# Patient Record
Sex: MALE | Race: WHITE | NOT HISPANIC OR LATINO | URBAN - METROPOLITAN AREA
[De-identification: names, ages, dates, MRNs, and addresses within clinical notes are randomized per-mention and may not be internally consistent; named-entity substitution may affect disease eponyms.]

---

## 2017-07-22 ENCOUNTER — EMERGENCY (EMERGENCY)
Facility: HOSPITAL | Age: 22
LOS: 1 days | Discharge: HOME | End: 2017-07-22
Attending: EMERGENCY MEDICINE | Admitting: EMERGENCY MEDICINE
Payer: MEDICAID

## 2017-07-22 VITALS
DIASTOLIC BLOOD PRESSURE: 83 MMHG | HEART RATE: 126 BPM | TEMPERATURE: 98 F | RESPIRATION RATE: 18 BRPM | WEIGHT: 279.99 LBS | SYSTOLIC BLOOD PRESSURE: 136 MMHG | OXYGEN SATURATION: 97 %

## 2017-07-22 VITALS
OXYGEN SATURATION: 97 % | DIASTOLIC BLOOD PRESSURE: 85 MMHG | SYSTOLIC BLOOD PRESSURE: 133 MMHG | TEMPERATURE: 100 F | RESPIRATION RATE: 18 BRPM | HEART RATE: 115 BPM

## 2017-07-22 DIAGNOSIS — R00.2 PALPITATIONS: ICD-10-CM

## 2017-07-22 DIAGNOSIS — R00.0 TACHYCARDIA, UNSPECIFIED: ICD-10-CM

## 2017-07-22 DIAGNOSIS — R07.9 CHEST PAIN, UNSPECIFIED: ICD-10-CM

## 2017-07-22 LAB
ANION GAP SERPL CALC-SCNC: 3 MMOL/L — LOW (ref 5–17)
BUN SERPL-MCNC: 15 MG/DL — SIGNIFICANT CHANGE UP (ref 7–23)
CALCIUM SERPL-MCNC: 9.1 MG/DL — SIGNIFICANT CHANGE UP (ref 8.5–10.1)
CHLORIDE SERPL-SCNC: 104 MMOL/L — SIGNIFICANT CHANGE UP (ref 96–108)
CO2 SERPL-SCNC: 31 MMOL/L — SIGNIFICANT CHANGE UP (ref 22–31)
CREAT SERPL-MCNC: 1 MG/DL — SIGNIFICANT CHANGE UP (ref 0.5–1.3)
D DIMER BLD IA.RAPID-MCNC: <150 NG/ML DDU — SIGNIFICANT CHANGE UP
GLUCOSE SERPL-MCNC: 112 MG/DL — HIGH (ref 70–99)
HCT VFR BLD CALC: 48.2 % — SIGNIFICANT CHANGE UP (ref 39–50)
HGB BLD-MCNC: 16.2 G/DL — SIGNIFICANT CHANGE UP (ref 13–17)
MCHC RBC-ENTMCNC: 29.2 PG — SIGNIFICANT CHANGE UP (ref 27–34)
MCHC RBC-ENTMCNC: 33.8 GM/DL — SIGNIFICANT CHANGE UP (ref 32–36)
MCV RBC AUTO: 86.6 FL — SIGNIFICANT CHANGE UP (ref 80–100)
PLATELET # BLD AUTO: 213 K/UL — SIGNIFICANT CHANGE UP (ref 150–400)
POTASSIUM SERPL-MCNC: 4.1 MMOL/L — SIGNIFICANT CHANGE UP (ref 3.5–5.3)
POTASSIUM SERPL-SCNC: 4.1 MMOL/L — SIGNIFICANT CHANGE UP (ref 3.5–5.3)
RBC # BLD: 5.56 M/UL — SIGNIFICANT CHANGE UP (ref 4.2–5.8)
RBC # FLD: 11.4 % — SIGNIFICANT CHANGE UP (ref 10.3–14.5)
SODIUM SERPL-SCNC: 138 MMOL/L — SIGNIFICANT CHANGE UP (ref 135–145)
TROPONIN I SERPL-MCNC: <.015 NG/ML — SIGNIFICANT CHANGE UP (ref 0.01–0.04)
WBC # BLD: 6.6 K/UL — SIGNIFICANT CHANGE UP (ref 3.8–10.5)
WBC # FLD AUTO: 6.6 K/UL — SIGNIFICANT CHANGE UP (ref 3.8–10.5)

## 2017-07-22 PROCEDURE — 99284 EMERGENCY DEPT VISIT MOD MDM: CPT

## 2017-07-22 PROCEDURE — 80048 BASIC METABOLIC PNL TOTAL CA: CPT

## 2017-07-22 PROCEDURE — 84484 ASSAY OF TROPONIN QUANT: CPT

## 2017-07-22 PROCEDURE — 36415 COLL VENOUS BLD VENIPUNCTURE: CPT

## 2017-07-22 PROCEDURE — 99285 EMERGENCY DEPT VISIT HI MDM: CPT

## 2017-07-22 PROCEDURE — 85027 COMPLETE CBC AUTOMATED: CPT

## 2017-07-22 PROCEDURE — 84443 ASSAY THYROID STIM HORMONE: CPT

## 2017-07-22 PROCEDURE — 85379 FIBRIN DEGRADATION QUANT: CPT

## 2017-07-22 PROCEDURE — 71046 X-RAY EXAM CHEST 2 VIEWS: CPT

## 2017-07-22 PROCEDURE — 93005 ELECTROCARDIOGRAM TRACING: CPT

## 2017-07-22 PROCEDURE — 71020: CPT | Mod: 26

## 2017-07-22 NOTE — ED ADULT NURSE NOTE - OBJECTIVE STATEMENT
pt to ed c/o palpitations, pt states he feels his heart racing, pt does seem anxious, no daily meds, afebrile, no SOB or diaphoresis, will continue to monitor

## 2017-07-22 NOTE — ED PROVIDER NOTE - CONSTITUTIONAL, MLM
Well appearing, obese male,  well nourished, awake, alert, oriented to person, place, time/situation and in no apparent distress. normal...

## 2017-07-22 NOTE — ED PROVIDER NOTE - OBJECTIVE STATEMENT
22 yo male with intermittent self limited episodes of left sided chest pains, ill defined with palpitations x few days here for evaluation. No cough or SOB. No DVT/PE/CAD risk factors.

## 2017-07-22 NOTE — ED PROVIDER NOTE - PROGRESS NOTE DETAILS
Feel well at this time. HR ranges from 110-130 w/o symptoms. Patient with his sister wants to go home to be followed up as outpatient.

## 2018-05-13 ENCOUNTER — TRANSCRIPTION ENCOUNTER (OUTPATIENT)
Age: 23
End: 2018-05-13

## 2018-06-28 ENCOUNTER — TRANSCRIPTION ENCOUNTER (OUTPATIENT)
Age: 23
End: 2018-06-28

## 2018-11-26 ENCOUNTER — EMERGENCY (EMERGENCY)
Facility: HOSPITAL | Age: 23
LOS: 1 days | Discharge: HOME | End: 2018-11-26
Attending: EMERGENCY MEDICINE
Payer: MEDICAID

## 2018-11-26 VITALS — WEIGHT: 248.9 LBS | HEIGHT: 71 IN

## 2018-11-26 LAB
ALBUMIN SERPL ELPH-MCNC: 4 G/DL — SIGNIFICANT CHANGE UP (ref 3.3–5)
ALP SERPL-CCNC: 568 U/L — HIGH (ref 40–120)
ALT FLD-CCNC: 25 U/L — SIGNIFICANT CHANGE UP (ref 12–78)
ANION GAP SERPL CALC-SCNC: 7 MMOL/L — SIGNIFICANT CHANGE UP (ref 5–17)
APPEARANCE UR: CLEAR — SIGNIFICANT CHANGE UP
AST SERPL-CCNC: 11 U/L — LOW (ref 15–37)
BASOPHILS # BLD AUTO: 0.01 K/UL — SIGNIFICANT CHANGE UP (ref 0–0.2)
BASOPHILS NFR BLD AUTO: 0.2 % — SIGNIFICANT CHANGE UP (ref 0–2)
BILIRUB SERPL-MCNC: 1.1 MG/DL — SIGNIFICANT CHANGE UP (ref 0.2–1.2)
BILIRUB UR-MCNC: NEGATIVE — SIGNIFICANT CHANGE UP
BUN SERPL-MCNC: 17 MG/DL — SIGNIFICANT CHANGE UP (ref 7–23)
CALCIUM SERPL-MCNC: 8.4 MG/DL — LOW (ref 8.5–10.1)
CHLORIDE SERPL-SCNC: 108 MMOL/L — SIGNIFICANT CHANGE UP (ref 96–108)
CO2 SERPL-SCNC: 27 MMOL/L — SIGNIFICANT CHANGE UP (ref 22–31)
COLOR SPEC: YELLOW — SIGNIFICANT CHANGE UP
CREAT SERPL-MCNC: 0.91 MG/DL — SIGNIFICANT CHANGE UP (ref 0.5–1.3)
DIFF PNL FLD: NEGATIVE — SIGNIFICANT CHANGE UP
EOSINOPHIL # BLD AUTO: 0.05 K/UL — SIGNIFICANT CHANGE UP (ref 0–0.5)
EOSINOPHIL NFR BLD AUTO: 0.8 % — SIGNIFICANT CHANGE UP (ref 0–6)
GLUCOSE SERPL-MCNC: 91 MG/DL — SIGNIFICANT CHANGE UP (ref 70–99)
GLUCOSE UR QL: NEGATIVE — SIGNIFICANT CHANGE UP
HCT VFR BLD CALC: 31.1 % — LOW (ref 39–50)
HGB BLD-MCNC: 10.4 G/DL — LOW (ref 13–17)
IMM GRANULOCYTES NFR BLD AUTO: 0.3 % — SIGNIFICANT CHANGE UP (ref 0–1.5)
KETONES UR-MCNC: NEGATIVE — SIGNIFICANT CHANGE UP
LEUKOCYTE ESTERASE UR-ACNC: NEGATIVE — SIGNIFICANT CHANGE UP
LIDOCAIN IGE QN: 154 U/L — SIGNIFICANT CHANGE UP (ref 73–393)
LYMPHOCYTES # BLD AUTO: 1.64 K/UL — SIGNIFICANT CHANGE UP (ref 1–3.3)
LYMPHOCYTES # BLD AUTO: 25.8 % — SIGNIFICANT CHANGE UP (ref 13–44)
MCHC RBC-ENTMCNC: 28.7 PG — SIGNIFICANT CHANGE UP (ref 27–34)
MCHC RBC-ENTMCNC: 33.4 GM/DL — SIGNIFICANT CHANGE UP (ref 32–36)
MCV RBC AUTO: 85.9 FL — SIGNIFICANT CHANGE UP (ref 80–100)
MONOCYTES # BLD AUTO: 0.47 K/UL — SIGNIFICANT CHANGE UP (ref 0–0.9)
MONOCYTES NFR BLD AUTO: 7.4 % — SIGNIFICANT CHANGE UP (ref 2–14)
NEUTROPHILS # BLD AUTO: 4.17 K/UL — SIGNIFICANT CHANGE UP (ref 1.8–7.4)
NEUTROPHILS NFR BLD AUTO: 65.5 % — SIGNIFICANT CHANGE UP (ref 43–77)
NITRITE UR-MCNC: NEGATIVE — SIGNIFICANT CHANGE UP
PH UR: 6.5 — SIGNIFICANT CHANGE UP (ref 5–8)
PLATELET # BLD AUTO: 140 K/UL — LOW (ref 150–400)
POTASSIUM SERPL-MCNC: 3.9 MMOL/L — SIGNIFICANT CHANGE UP (ref 3.5–5.3)
POTASSIUM SERPL-SCNC: 3.9 MMOL/L — SIGNIFICANT CHANGE UP (ref 3.5–5.3)
PROT SERPL-MCNC: 7.5 G/DL — SIGNIFICANT CHANGE UP (ref 6–8.3)
PROT UR-MCNC: NEGATIVE — SIGNIFICANT CHANGE UP
RBC # BLD: 3.62 M/UL — LOW (ref 4.2–5.8)
RBC # FLD: 12.2 % — SIGNIFICANT CHANGE UP (ref 10.3–14.5)
SODIUM SERPL-SCNC: 142 MMOL/L — SIGNIFICANT CHANGE UP (ref 135–145)
SP GR SPEC: 1.01 — SIGNIFICANT CHANGE UP (ref 1.01–1.02)
UROBILINOGEN FLD QL: NEGATIVE — SIGNIFICANT CHANGE UP
WBC # BLD: 6.36 K/UL — SIGNIFICANT CHANGE UP (ref 3.8–10.5)
WBC # FLD AUTO: 6.36 K/UL — SIGNIFICANT CHANGE UP (ref 3.8–10.5)

## 2018-11-26 PROCEDURE — 76705 ECHO EXAM OF ABDOMEN: CPT | Mod: 26

## 2018-11-26 PROCEDURE — 74177 CT ABD & PELVIS W/CONTRAST: CPT | Mod: 26

## 2018-11-26 PROCEDURE — 99285 EMERGENCY DEPT VISIT HI MDM: CPT

## 2018-11-26 RX ORDER — FAMOTIDINE 10 MG/ML
1 INJECTION INTRAVENOUS
Qty: 0 | Refills: 0 | COMMUNITY

## 2018-11-26 RX ORDER — SODIUM CHLORIDE 9 MG/ML
1000 INJECTION INTRAMUSCULAR; INTRAVENOUS; SUBCUTANEOUS ONCE
Qty: 0 | Refills: 0 | Status: COMPLETED | OUTPATIENT
Start: 2018-11-26 | End: 2018-11-26

## 2018-11-26 RX ORDER — SODIUM CHLORIDE 9 MG/ML
3 INJECTION INTRAMUSCULAR; INTRAVENOUS; SUBCUTANEOUS ONCE
Qty: 0 | Refills: 0 | Status: COMPLETED | OUTPATIENT
Start: 2018-11-26 | End: 2018-11-26

## 2018-11-26 RX ORDER — PANTOPRAZOLE SODIUM 20 MG/1
1 TABLET, DELAYED RELEASE ORAL
Qty: 0 | Refills: 0 | COMMUNITY

## 2018-11-26 RX ADMIN — SODIUM CHLORIDE 3 MILLILITER(S): 9 INJECTION INTRAMUSCULAR; INTRAVENOUS; SUBCUTANEOUS at 22:16

## 2018-11-26 RX ADMIN — SODIUM CHLORIDE 1000 MILLILITER(S): 9 INJECTION INTRAMUSCULAR; INTRAVENOUS; SUBCUTANEOUS at 23:07

## 2018-11-26 RX ADMIN — SODIUM CHLORIDE 1000 MILLILITER(S): 9 INJECTION INTRAMUSCULAR; INTRAVENOUS; SUBCUTANEOUS at 22:15

## 2018-11-26 NOTE — ED PROVIDER NOTE - OBJECTIVE STATEMENT
23 year old male with history of GERD presents with abdominal pain since yesterday. started last night around 8:00 pm after playing soccer. denies any injury or trauma. pain 4/10. worse with movement. has not taken anything for pain or symptoms. no n/v/d. no fever or loss or appetite  PCP Rogelio Chaudhry

## 2018-11-26 NOTE — ED ADULT NURSE NOTE - NSIMPLEMENTINTERV_GEN_ALL_ED
Implemented All Universal Safety Interventions:  [place] to call system. Call bell, personal items and telephone within reach. Instruct patient to call for assistance. Room bathroom lighting operational. Non-slip footwear when patient is off stretcher. Physically safe environment: no spills, clutter or unnecessary equipment. Stretcher in lowest position, wheels locked, appropriate side rails in place.

## 2018-11-26 NOTE — ED PROVIDER NOTE - PROGRESS NOTE DETAILS
resting comfortably. waiting for CT and US results pt and family aware of the us and labs await ct still has rlq discomfort

## 2018-11-26 NOTE — ED PROVIDER NOTE - ATTENDING CONTRIBUTION TO CARE
Pt is a 22 yo male who has hx of gerd and formerly obese was on bp meds not any longer  presents with 24 hours of r sided abd pain no fever n chills no n v d   no cp no sob no urine sx no back pain  on eval:  obese heent cor lungs chest normal  pt has tender to r paraumbilical area no mejia no cvat  ext neg  neuro normal  skin normal  ro appy ro gb labs

## 2018-11-26 NOTE — ED ADULT NURSE NOTE - OBJECTIVE STATEMENT
Received pt awake alert and oriented x 3, CHICAS. Pt present to the ED CO abdominal pain started yesterday, denies nausea a, vomiting or diarrhea. Vss, afebrile. Iv placed, lab sent. Fluid started as ordered by MD. will continue to monitor.

## 2018-11-26 NOTE — ED PROVIDER NOTE - MEDICAL DECISION MAKING DETAILS
right sided abdominal pain since yesterday. has mild pain to both upper and lower quadrants. will US to eval for gallbladder and CT to eval for appendicitis. will also order CBC, CMP, lipase, and UA. will give IV fluids. declines pain or nausea meds

## 2018-11-26 NOTE — ED PROVIDER NOTE - CHPI ED SYMPTOMS NEG
no diarrhea/no chills/no dysuria/no hematuria/no fever/no burning urination/no vomiting/no abdominal distension/no blood in stool/no nausea

## 2018-11-27 ENCOUNTER — TRANSCRIPTION ENCOUNTER (OUTPATIENT)
Age: 23
End: 2018-11-27

## 2018-11-27 VITALS
TEMPERATURE: 98 F | DIASTOLIC BLOOD PRESSURE: 76 MMHG | RESPIRATION RATE: 18 BRPM | OXYGEN SATURATION: 97 % | SYSTOLIC BLOOD PRESSURE: 126 MMHG | HEART RATE: 89 BPM

## 2018-11-27 PROCEDURE — 99284 EMERGENCY DEPT VISIT MOD MDM: CPT | Mod: 25

## 2018-11-27 PROCEDURE — 85027 COMPLETE CBC AUTOMATED: CPT

## 2018-11-27 PROCEDURE — 83690 ASSAY OF LIPASE: CPT

## 2018-11-27 PROCEDURE — 80053 COMPREHEN METABOLIC PANEL: CPT

## 2018-11-27 PROCEDURE — 76705 ECHO EXAM OF ABDOMEN: CPT

## 2018-11-27 PROCEDURE — 36415 COLL VENOUS BLD VENIPUNCTURE: CPT

## 2018-11-27 PROCEDURE — 74177 CT ABD & PELVIS W/CONTRAST: CPT

## 2018-11-27 PROCEDURE — 81003 URINALYSIS AUTO W/O SCOPE: CPT

## 2018-11-27 PROCEDURE — 96374 THER/PROPH/DIAG INJ IV PUSH: CPT

## 2018-11-27 PROCEDURE — 96360 HYDRATION IV INFUSION INIT: CPT

## 2018-11-27 RX ORDER — KETOROLAC TROMETHAMINE 30 MG/ML
30 SYRINGE (ML) INJECTION ONCE
Qty: 0 | Refills: 0 | Status: DISCONTINUED | OUTPATIENT
Start: 2018-11-27 | End: 2018-11-27

## 2018-11-27 RX ADMIN — Medication 30 MILLIGRAM(S): at 01:14

## 2018-12-27 ENCOUNTER — TRANSCRIPTION ENCOUNTER (OUTPATIENT)
Age: 23
End: 2018-12-27

## 2019-02-17 ENCOUNTER — TRANSCRIPTION ENCOUNTER (OUTPATIENT)
Age: 24
End: 2019-02-17

## 2019-03-24 ENCOUNTER — TRANSCRIPTION ENCOUNTER (OUTPATIENT)
Age: 24
End: 2019-03-24

## 2019-03-29 ENCOUNTER — TRANSCRIPTION ENCOUNTER (OUTPATIENT)
Age: 24
End: 2019-03-29

## 2019-06-28 ENCOUNTER — TRANSCRIPTION ENCOUNTER (OUTPATIENT)
Age: 24
End: 2019-06-28

## 2020-03-23 NOTE — ED ADULT NURSE NOTE - CAS EDN DISCHARGE ASSESSMENT
interpretation of diagnostic studies/documentation/consultation with other physicians/conducted a detailed discussion of DNR status/additional history taking/direct patient care (not related to procedure)
Alert and oriented to person, place and time

## 2021-08-24 NOTE — ED ADULT NURSE NOTE - CAS DISCH CONDITION
Procedure Note:


ICD 10 Code:


ICD 10 Code:


M70.72





Procedure Note:


Patient was consented for left ischial tuberosity bursa injection with 

fluoroscopic guidance.  Risk were discussed including not limited to bleeding 

infection possibility of intravascular ejection sequelae spread local anesthetic

and numbness side effects steroid medications post arthroscopy and portals 

regarding pain control.  Patient understands and wished proceed.


Patient in the right lateral decubitus position under sterile prep and drape 

using C-arm fluoroscopic guidance patient's left ischial tuberosity inferior 

aspect was identified and using local anesthetic area over the skin was 

anesthetized using a 25-gauge needle was entered under direct fluoroscopic 

visualization to contact to the inferior aspect of the left ischial tuberosity. 

This time negative aspiration was noted and 1 cc of contrast was injected with 

good spread at the level of the ischial tuberosity without washout or uptake.  

At this time solution containing 3 cc of 0.25% bupivacaine and 80 mg Depo-Medrol

was then injected.  Needle withdrawn and sterile bandage was applied.  Patient 

tolerated procedure well and had no complications.











JEN CASTRO MD               Aug 24, 2021 14:52 Stable

## 2022-08-08 PROBLEM — Z00.00 ENCOUNTER FOR PREVENTIVE HEALTH EXAMINATION: Status: ACTIVE | Noted: 2022-08-08

## 2022-09-07 ENCOUNTER — APPOINTMENT (OUTPATIENT)
Dept: NEUROLOGY | Facility: CLINIC | Age: 27
End: 2022-09-07

## 2022-09-07 VITALS
TEMPERATURE: 98.4 F | BODY MASS INDEX: 35.7 KG/M2 | SYSTOLIC BLOOD PRESSURE: 149 MMHG | WEIGHT: 255 LBS | OXYGEN SATURATION: 97 % | HEART RATE: 97 BPM | HEIGHT: 71 IN | DIASTOLIC BLOOD PRESSURE: 96 MMHG | RESPIRATION RATE: 20 BRPM

## 2022-09-07 VITALS — SYSTOLIC BLOOD PRESSURE: 133 MMHG | DIASTOLIC BLOOD PRESSURE: 88 MMHG

## 2022-09-07 DIAGNOSIS — Z82.49 FAMILY HISTORY OF ISCHEMIC HEART DISEASE AND OTHER DISEASES OF THE CIRCULATORY SYSTEM: ICD-10-CM

## 2022-09-07 DIAGNOSIS — K21.9 GASTRO-ESOPHAGEAL REFLUX DISEASE W/OUT ESOPHAGITIS: ICD-10-CM

## 2022-09-07 PROCEDURE — 99205 OFFICE O/P NEW HI 60 MIN: CPT

## 2022-09-07 RX ORDER — PANTOPRAZOLE 40 MG/1
40 TABLET, DELAYED RELEASE ORAL
Refills: 0 | Status: ACTIVE | COMMUNITY

## 2022-09-07 RX ORDER — SUMATRIPTAN 50 MG/1
50 TABLET, FILM COATED ORAL
Qty: 15 | Refills: 3 | Status: ACTIVE | COMMUNITY
Start: 2022-09-07 | End: 1900-01-01

## 2022-09-07 NOTE — ASSESSMENT
[FreeTextEntry1] : Assessment/Plan:\par  # Intermittent left hemibody paresthesias- unclear etiology of symptoms. Initial presentation of tingling over hands could likely have been explained by a carpal tunnel +/- cubital tunnel syndrome. Given his age, need to rule out a CNS pathology- especially demyelinating disease. Other considerations could be a migraine aura/phenomena- though atypical given duration of symptoms, or a psychosomatic symptom in the setting of anxiety/depression.\par [] MRI brain and cervical spine w/w/o to rule out demyelinating disease\par [] Recommend speaking to PCP regarding mood disorder. Recommending seeing a behavioral health therapist\par \par \par # Migraine headaches- \par [] Start sumatriptan 50 mg tablet, take 1 tablet at the onset of the headache. Can repeat dose in 2 hours if needed. Limit dose to 2 tab/day and 5 tab/week. Discussed the side effects drowsiness and nausea/diarrhea.\par [] Over the counter preventative therapies discussed, which include Magnesium 400 mg QD (discussed potential side effect of diarrhea), riboflavin 400 mg QD and Coenzyme Q10 100 mg daily.\par Headache education provided:\par [] Stay well hydrated\par [] Limit excessive caffeine and alcohol intake\par [] Maintain good sleep hygiene. Follow a consistent sleep and wake schedule. \par [] Practice good eating habits. Avoid skipping meals. \par [] Try to avoid any known triggers\par [] Avoid excessive use of over the counter pain medications, as they can cause medication overuse headaches \par [] Keep a headache diary\par [] Relaxation techniques, biofeedback, massage therapy, acupunctures, and heating pads may be effective\par \par \par Return to clinic 6-8 weeks\par \par The above plan was discussed with LINDA MCLAUGHLIN in great detail.  LINDA MCLAUGHLIN verbalized understanding and agrees with plan as detailed above. Patient was provided education and counselling on current diagnosis/symptoms, diagnostic work up, treatment options and potential side effects of any prescribed therapy/therapies. He was advised to call our clinic at [phone] for any new or worsening symptoms, or with any questions or concerns. In case of acute onset of neurological symptoms or worsening presentation, patient was advised to present to nearest emergency room for further evaluation. LINDA MCLAUGHLIN expressed understanding and all his questions/concerns were addressed.\par \par Marcelle Ybarra M.D\par

## 2022-09-07 NOTE — PHYSICAL EXAM
[FreeTextEntry1] : PHYSICAL EXAM\par Constitutional: Alert, no acute distress \par Psychiatric: appropriate affect and mood\par Pulmonary: No respiratory distress, stable on room air\par \par NEUROLOGICAL EXAM\par Mental status: The patient is alert, attentive, and conversational memory intact\par Speech/language: Clear and fluent with intact comprehension\par Cranial nerves:\par CN II: Visual fields are full to confrontation. Pupil size equal and briskly reactive to light. \par CN III, IV, VI: EOMI, no nystagmus, no ptosis\par CN V: Facial sensation is intact to pinprick in all 3 divisions bilaterally.\par CN VII: Face is symmetric with normal eye closure and smile.\par CN VII: Hearing is normal to rubbing fingers\par CN IX, X: Palate elevates symmetrically. Phonation is normal.\par CN XI: Head turning and shoulder shrug are intact\par CN XII: Tongue is midline with normal movements and no atrophy.\par Motor: Strength is full bilaterally. 5/5 muscle power in bilateral UE and LE.\par Reflexes: Reflexes are 2+ and symmetric at the biceps, knees, and ankles. Plantar responses are flexor.\par Sensory: Intact sensations to light touch in upper and lower extremities. \par Coordination: Rapid alternating movements and fine finger movements are intact. There is no dysmetria on finger-to-nose. There are no abnormal or extraneous movements. \par Gait/Stance: Posture is normal. Gait is steady with normal steps, base, arm swing, and turning. Heel and toe walking are normal. Tandem gait is normal. Romberg is absent.\par \par \par \par \par

## 2022-09-07 NOTE — HISTORY OF PRESENT ILLNESS
[FreeTextEntry1] : HPI (initial visit Sep 07, 2022)-Cherelle is a 26 year old RH male w/ of GERD , here for evaluation of sensory disturbances over left hemibody.\par \par Around June 2021- started having tingling in left hand over pinky and hypothenar eminence. 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 Spoke to a family doctor and told he had pinched a nerve and recommended avoiding too much pressure on hand- this improved his symptoms. Experienced similar symptoms in right hand, though mild and not bothersome.  \par Around Dec 2021, started to get tingly over back of left leg- over calf, this would happen at rest and not necessarily with activity. It does not involve his foot. It is intermittent.\par  Since 8/4/2022 began to experience tingling over left half of face, this was constant, and now improved-"Feels like goose bumps and gets worse when excited". Also experiences some "goose bumps" over left shoulder and left thigh- heightens when watching a good movie or playing video games.  He again spoke to family doctor and suggested stopping famotidine and this seemed to help.\par \par He does experience headaches when working out, not eating or sleeping well. He describes unilateral headaches (mostly left sided). More frequently lately, more sporadic in the past. NEeds to take OTC medications (Tylenol) now. + Photophobia. No nausea or vomiting. No vision changes. Duration: a few hours. Sister and mother have migraines. Not sure if tingling occurs with headaches. \par \par "Sleep is really bad". Since COVID has been experiencing some depression. Father had a cardiac arrest and that "was the worst experience I ever had". This caused him anxiety. He changed his routines and staying up more at night and plays video games. He is now working on sleep schedule. He has lost his motivation drive. His school performance worsened around cOVID and after father had cardiac arrest (needed CPR).\par \par Labs from PCP reviewed 8/22/022- TSH, CMP, CMP, Lyme, TEO negative, HbA1c. \par No hx of COVID infection.\par \par \par \par

## 2022-09-27 ENCOUNTER — NON-APPOINTMENT (OUTPATIENT)
Age: 27
End: 2022-09-27

## 2022-10-12 ENCOUNTER — NON-APPOINTMENT (OUTPATIENT)
Age: 27
End: 2022-10-12

## 2022-10-13 ENCOUNTER — EMERGENCY (EMERGENCY)
Facility: HOSPITAL | Age: 27
LOS: 1 days | Discharge: HOME | End: 2022-10-13
Attending: EMERGENCY MEDICINE | Admitting: EMERGENCY MEDICINE
Payer: MEDICAID

## 2022-10-13 VITALS
RESPIRATION RATE: 22 BRPM | TEMPERATURE: 100 F | SYSTOLIC BLOOD PRESSURE: 129 MMHG | HEIGHT: 71 IN | WEIGHT: 250 LBS | OXYGEN SATURATION: 100 % | DIASTOLIC BLOOD PRESSURE: 87 MMHG | HEART RATE: 123 BPM

## 2022-10-13 VITALS
HEART RATE: 116 BPM | OXYGEN SATURATION: 97 % | RESPIRATION RATE: 17 BRPM | SYSTOLIC BLOOD PRESSURE: 117 MMHG | DIASTOLIC BLOOD PRESSURE: 72 MMHG | TEMPERATURE: 98 F

## 2022-10-13 PROCEDURE — 27840 TREAT ANKLE DISLOCATION: CPT | Mod: 54

## 2022-10-13 PROCEDURE — 96375 TX/PRO/DX INJ NEW DRUG ADDON: CPT

## 2022-10-13 PROCEDURE — 99284 EMERGENCY DEPT VISIT MOD MDM: CPT | Mod: 57

## 2022-10-13 PROCEDURE — 99284 EMERGENCY DEPT VISIT MOD MDM: CPT

## 2022-10-13 PROCEDURE — 73600 X-RAY EXAM OF ANKLE: CPT | Mod: 26,RT

## 2022-10-13 PROCEDURE — 27840 TREAT ANKLE DISLOCATION: CPT | Mod: RT

## 2022-10-13 PROCEDURE — 96374 THER/PROPH/DIAG INJ IV PUSH: CPT

## 2022-10-13 PROCEDURE — 73600 X-RAY EXAM OF ANKLE: CPT

## 2022-10-13 PROCEDURE — 73610 X-RAY EXAM OF ANKLE: CPT

## 2022-10-13 RX ORDER — FENTANYL CITRATE 50 UG/ML
100 INJECTION INTRAVENOUS ONCE
Refills: 0 | Status: DISCONTINUED | OUTPATIENT
Start: 2022-10-13 | End: 2022-10-13

## 2022-10-13 RX ORDER — MIDAZOLAM HYDROCHLORIDE 1 MG/ML
4 INJECTION, SOLUTION INTRAMUSCULAR; INTRAVENOUS ONCE
Refills: 0 | Status: DISCONTINUED | OUTPATIENT
Start: 2022-10-13 | End: 2022-10-13

## 2022-10-13 RX ORDER — HYDROMORPHONE HYDROCHLORIDE 2 MG/ML
1 INJECTION INTRAMUSCULAR; INTRAVENOUS; SUBCUTANEOUS ONCE
Refills: 0 | Status: DISCONTINUED | OUTPATIENT
Start: 2022-10-13 | End: 2022-10-13

## 2022-10-13 RX ADMIN — MIDAZOLAM HYDROCHLORIDE 4 MILLIGRAM(S): 1 INJECTION, SOLUTION INTRAMUSCULAR; INTRAVENOUS at 21:44

## 2022-10-13 RX ADMIN — HYDROMORPHONE HYDROCHLORIDE 1 MILLIGRAM(S): 2 INJECTION INTRAMUSCULAR; INTRAVENOUS; SUBCUTANEOUS at 21:53

## 2022-10-13 RX ADMIN — FENTANYL CITRATE 100 MICROGRAM(S): 50 INJECTION INTRAVENOUS at 21:44

## 2022-10-13 NOTE — ED ADULT NURSE NOTE - NS ED NURSE LEVEL OF CONSCIOUSNESS ORIENTATION
[FreeTextEntry1] : Started on Fluconazole\par To see again the urologist call if not well Oriented - self; Oriented - place; Oriented - time

## 2022-10-13 NOTE — ED PROVIDER NOTE - CARE PROVIDER_API CALL
Sammy Stein)  Orthopaedic Surgery; Sports Medicine  [address], [address] [place] [address]  Phone: [phone]  Fax: [phone]  Follow Up Time: 1-3 Days    Magdiel Ledbetter)  Orthopaedic Surgery  [address] [place] [address]  Phone: [phone]  Fax: [phone]  Follow Up Time:     Hardik Blair ()  Orthopaedic Surgery  [address] [place] [address]  Phone: [phone]  Fax: [phone]  Follow Up Time:

## 2022-10-13 NOTE — ED PROVIDER NOTE - PROVIDER TOKENS
PROVIDER:[TOKEN:[[id number]],FOLLOWUP:[1-3 Days]],PROVIDER:[TOKEN:[[id number]]],PROVIDER:[TOKEN:[[id number]]]

## 2022-10-13 NOTE — ED PROVIDER NOTE - PATIENT PORTAL LINK FT
You can access the FollowMyHealth Patient Portal offered by [facility] by registering at the following website: [email or web address]. By joining [employer]’s FollowMyHealth portal, you will also be able to view your health information using other applications (apps) compatible with our system.

## 2022-10-13 NOTE — ED PROVIDER NOTE - CARE PLAN
1 Principal Discharge DX:	Fracture dislocation of ankle   Principal Discharge DX:	Dislocation of ankle

## 2022-10-13 NOTE — ED PROVIDER NOTE - NSFOLLOWUPINSTRUCTIONS_ED_ALL_ED_FT
Ankle Dislocation    Bones of the lower leg and foot showing an ankle dislocation.   Ankle dislocation is an injury in which the bones that form the ankle are moved out of position. The bones that form your ankle are the two bones in your lower leg and the bone on top of your foot.      What are the causes?    Common causes of this injury include:  •Tripping.      •Falling.      •A car accident.      This injury happens when a lot of force is applied to the ankle, such as when it is twisted or rotated.      What increases the risk?    This injury is more likely to develop in people who:  •Are born with looseness (elasticity) in their ligaments.      •Have weak lower leg muscles.      •Sprain their ankle often.        What are the signs or symptoms?  Comparison of a normal ankle and an ankle that is swollen and bruised.   Symptoms of this injury include:  •Severe pain.      •A misshapen or deformed ankle.      •Swelling.      •Bruising.      •Not being able to move the foot.      •Not being able to use the foot to support (bear) body weight.        How is this diagnosed?    This injury may be diagnosed with:  •A physical exam.      •An X-ray. This test will show any bones that are out of place.      You may have other tests, such as:  •MRI or a CT scan to check for broken (fractured) bones and injuries to soft tissue (ligaments).      •An ultrasound to check for damage to any blood vessels.        How is this treated?    This injury is treated with a procedure to return the bones to their normal position (reduction). This procedure needs to be done as soon as possible. Delaying treatment can lead to complications and improper healing of the injury.    There are two kinds of reductions:  •Closed reduction. This kind is done without surgery. It is done after a medicine is given to make you fall asleep or to numb your ankle.      •Open reduction. This kind is done with surgery. It may be done if you also have a fracture or an open wound, or if a closed reduction is not successful.      After a reduction, your foot and ankle will be put in a cast, splint, or boot. After your ankle has healed, you may need physical therapy.      Follow these instructions at home:    If you have a cast:     • Do not stick anything inside the cast to scratch your skin. Doing that increases your risk of infection.      •Check the skin around the cast every day. Tell your health care provider about any concerns.      •You may put lotion on dry skin around the edges of the cast. Do not put lotion on the skin underneath the cast.      •Keep the cast clean and dry.      If you have a splint or boot:     •Wear the splint or boot as told by your health care provider. Remove it only as told by your health care provider.      •Loosen the splint or boot if your toes tingle, become numb, or turn cold and blue.      •Keep the splint or boot clean and dry.      Bathing     • Do not take baths, swim, or use a hot tub until your health care provider approves. Ask your health care provider if you may take showers. You may only be allowed to take sponge baths.    •If the cast, splint, or boot is not waterproof:  •Do not let it get wet.      •Cover it with a watertight covering when you take a bath or shower.          Managing pain, stiffness, and swelling   Bag of ice on a towel on the skin. •If directed, put ice on the injured area.  •If you have a removable splint or boot, remove it as told by your health care provider.      •Put ice in a plastic bag.      •Place a towel between your skin and the bag.      •Leave the ice on for 20 minutes, 2–3 times a day.        •Move your toes often to avoid stiffness and to lessen swelling.      •Raise (elevate) the injured area above the level of your heart while you are sitting or lying down.      Driving     • Do not drive or use heavy machinery while taking prescription pain medicine.      •Ask your health care provider when it is safe to drive if you have a cast or splint on your foot or ankle.      Activity     • Do not use the injured limb to support your body weight until your health care provider says that you can. Use crutches or a walker as told by your health care provider.      •Return to your normal activities as told by your health care provider. Ask your health care provider what activities are safe for you.      General instructions     • Do not put pressure on any part of the cast or splint until it is fully hardened. This may take several hours.      •Take over-the-counter and prescription medicines only as told by your health care provider.      • Do not use any products that contain nicotine or tobacco, such as cigarettes, e-cigarettes, and chewing tobacco. These can delay bone healing. If you need help quitting, ask your health care provider.    •Ask your health care provider if the medicine prescribed to you can cause constipation. You may need to take steps to prevent or treat constipation, such as:  •Drink enough fluid to keep your urine pale yellow.      •Take over-the-counter or prescription medicines.      •Eat foods that are high in fiber, such as beans, whole grains, and fresh fruits and vegetables.      •Limit foods that are high in fat and processed sugars, such as fried or sweet foods.        •Keep all follow-up visits as told by your health care provider. This is important.        Contact a health care provider if:  •You have:  •Pain that is not controlled by your medicine.      •A fever.      •Tingling in your foot or your toes.        •Your foot or your toes become cold or numb.      •Your cast, splint, or boot becomes damaged.        Get help right away if:  •You have:  •Severe pain.      •Increasing swelling.        •You lose feeling in your toes.      •Your toes become very pale or blue.    •You have:  •Warmth, pain, and tenderness in your calf area.      •Chest pain.      •Difficulty breathing.          Summary    •Ankle dislocation is an injury in which the bones that form the ankle are moved out of position.      •This injury happens when a lot of force is applied to the ankle, such as when it is twisted or rotated.      •This injury is treated with a procedure to return the bones to their normal position (reduction).      This information is not intended to replace advice given to you by your health care provider. Make sure you discuss any questions you have with your health care provider.

## 2022-10-13 NOTE — ED PROVIDER NOTE - OBJECTIVE STATEMENT
Patient states he was jumping over another individual and landed wrong on his right ankle.  Ankle twisted inward.  Complains of pain to ankle but denies other injury.  EMS placed ankle in a splint and transported him here. he was given pain medications but still complains of severe pain.

## 2022-10-13 NOTE — ED PROVIDER NOTE - CARE PROVIDERS DIRECT ADDRESSES
[email or web address],DirectAddress_Unknown

## 2022-10-14 ENCOUNTER — APPOINTMENT (OUTPATIENT)
Dept: ORTHOPEDIC SURGERY | Facility: CLINIC | Age: 27
End: 2022-10-14

## 2022-10-14 ENCOUNTER — NON-APPOINTMENT (OUTPATIENT)
Age: 27
End: 2022-10-14

## 2022-10-14 VITALS
BODY MASS INDEX: 35 KG/M2 | WEIGHT: 250 LBS | DIASTOLIC BLOOD PRESSURE: 84 MMHG | HEART RATE: 88 BPM | SYSTOLIC BLOOD PRESSURE: 129 MMHG | HEIGHT: 71 IN

## 2022-10-14 PROCEDURE — 73610 X-RAY EXAM OF ANKLE: CPT | Mod: RT

## 2022-10-14 PROCEDURE — 99204 OFFICE O/P NEW MOD 45 MIN: CPT

## 2022-10-14 NOTE — REASON FOR VISIT
[Initial Visit] : an initial visit for [Family Member] : family member [FreeTextEntry2] : acute right ankle injury

## 2022-10-14 NOTE — ADDENDUM
[FreeTextEntry1] : I, Amber Eduardo, acted solely as a scribe for Dr. Tolu Carson on this date 10/14/2022.\par \par All medical record entries made by the Scribe were at my, Dr. Tolu Carson, direction and personally dictated by me on 10/14/2022. I have reviewed the chart and agree that the record accurately reflects my personal performance of the history, physical exam, assessment and plan. I have also personally directed, reviewed, and agreed with the chart.	\par

## 2022-10-14 NOTE — PHYSICAL EXAM
[de-identified] : Physical exam right ankle:\par \par Limited physical exam of the ankle due to protective splint.  Patient able to wiggle his toes and has normal sensation in his toes.  Neurovascularly intact.  Capillary refill in the toes is less than 2 seconds.  Normal knee range of motion without pain. [de-identified] : Xrays of right ankle obtained from [facility] on 10/13/2022 and reviewed in the office today, 10/14/2022 , revealed: Subtalar joint dislocation.\par \par 3V of the right ankle were ordered, obtained, and reviewed by me today, 10/14/2022, revealed: Subtalar joint dislocation.\par

## 2022-10-14 NOTE — REVIEW OF SYSTEMS
How much gabapentin is she taking? It looks like she had been instructed by psychiatry to wean off of this in May.    Raheel Barry PA-C     [Joint Pain] : joint pain [Negative] : Heme/Lymph

## 2022-10-14 NOTE — DISCUSSION/SUMMARY
[de-identified] : Today I had a lengthy discussion with the patient regarding their right ankle injury. I have addressed all the patient's concerns surrounding the pathology of their condition. XR imaging results were reviewed with the patient. At this time, I recommended to the patient continue to wear the posterior splint given the acuity of the injury. He should continue to utilize the crutches. He should remain nonweightbearing until further evaluation. I advised the patient to utilize 325 mg of Aspirin as instructed for blood thinning purposes. The prescription for the Aspirin was provided for the patient in the office today. I recommend that the patient utilize ice and NSAIDs/Tylenol PRN. They can also elevate their RLE above the level of the heart. The patient understood and verbally agreed to the treatment plan. All of their questions were answered and they were satisfied with the visit. The patient should call the office if they have any questions or experience worsening symptoms. I would like to see the patient back in the office in 1 week to reassess their condition. We discussed likely obtaining an MRI at the patient's next visit. \par \par Aspirin to Xarelto to Lovenox if needed.

## 2022-10-14 NOTE — HISTORY OF PRESENT ILLNESS
[FreeTextEntry1] : The patient is a 27 year old male presenting with his sister for an initial evaluation of an acute right ankle injury sustained last night. The patient reports that while exercising, he jumped and rolled his ankle, resulting in a dislocation. He was seen at [facility] last night where he obtained x-rays and was placed in a posterior splint. His ankle was reduced in the hospital. He presents today for further evaluation of the same and for orthopedic evaluation. The patient presents ambulating with crutches and is wearing the posterior splint. Pain is localized to the ankle, with concerns of swelling. He is taking Tylenol PRN for pain. No other complaints. \par \par Of note, the patient is currently on a GI medication as per his sister.

## 2022-10-17 ENCOUNTER — APPOINTMENT (OUTPATIENT)
Dept: ORTHOPEDIC SURGERY | Facility: CLINIC | Age: 27
End: 2022-10-17

## 2022-10-18 NOTE — ED PROCEDURE NOTE - NS ED ATTENDING STATEMENT MOD
10/18/2022       RE: Truman Reynolds  [address]  [facility] [address]     Dear Colleague,    Thank you for referring your patient, Truman Reynolds, to the [facility] [place] at [facility]. Please see a copy of my visit note below.    Pt was seen for neuropsychological evaluation at the request of Junaid Barrett MD for the purposes of diagnostic clarification and treatment planning. 204 minutes of test administration and scoring were provided by this writer. Please see Dr. Lebron Doyle's report for a full interpretation of the findings.    Rosa Maria Diaz  Psychometrist         NEUROPSYCHOLOGICAL EVALUATION    RELEVANT HISTORY AND REASON FOR REFERRAL    This is a report of neuropsychological consultation regarding Truman Reynolds, a 67-year-old right-handed man with 12 years of formal education. Mr. Reynolds was referred for neuropsychological consultation by his neurologist, Junaid Barrett MD, for evaluation of his current cognitive and psychiatric functioning in the context of tremor and potential DBS surgery.    Records (10/27/22) indicate that Mr. Reynolds has a family history of essential tremor (mother, 2 brothers). His tremors onset around age 42 and remained stable and manageable until approximately 2 years ago. In December 2020, he began to experience difficulty with writing, eating, and using a screwdriver. He sought treatment for the first time in December 2020. Upon initial appointment, it was noted that he displayed resting tremor, rigidity, bradykinesia, and a reduced arm swing. The working diagnosis was essential tremor and  superimposed Parkinson s disease. He was started on primidone. In March 2022, Mr. Reynolds sought a second opinion and impressions were consistent with essential tremor and Parkinson s disease. He was started on carbidopa/levodopa but discontinued due to worsening tremor, dizziness, and racing heart.  He continues to take primidone for tremor management. It was recommended that he wean off lithium (for treatment of bipolar disorder type 1) in favor of another mood stabilizing agent. In September 2022 he was switched from lithium to lamotrigine. Mr. Reynolds noted significant tremor improvement following the medication change. He also reported continued interest in pursuing DBS treatment.     A DATscan completed on 9/13/22 revealed that  a presynaptic dopaminergic deficit is present.   Medical history is significant for essential hypertension, bipolar disorder type 1, abnormal involuntary movement, acquired trigger finger, asthma, carpal tunnel syndrome, degenerative joint disease, diabetes mellitus type 2, familial tremor, history of atrial fibrillation, history of hepatitis C, hyperlipidemia, lower urinary tract symptoms, migraine headache, obesity, obstructive sleep apnea, osteoarthritis, polysubstance dependence, recurrent major depressive episodes, lobectomy of lung, and tubular adenoma of colon. He sustained a cerebrovascular accident due to stenosis of left middle cerebral artery in 2019. He denied history of concussion, traumatic brain injury, and seizure. He reported remote history of migraines. He endorsed neuropathy in the ball of his foot. Family medical and psychiatric history is significant for tremor (mother, 2 brothers), brain tumor (sister), and alcohol use disorder (maternal grandfather, paternal uncles). Current medications include albuterol, amitriptyline, atorvastatin, dulaglutide, epinephrine, insulin glargine, insulin lispro, ipratropium, lamotrigine, lisinopril, olopatadine, polyethylene glycol, primidone, and rivaroxaban.     During today s interview, Mr. Reynolds was accompanied by his wife who offered historical information. The couple confirmed information from his medical record and provided additional detail. He explained that his bilateral hand tremor began around age 42, with his  dominant right hand being consistently worse than the left. Tremor has been managed with primidone. He has historically had trouble eating and drinking without making messes. He reportedly trained himself to use his non-dominant left hand when eating and drinking to avoid making messes. He also described difficulty with fine-motor tasks, such as buttoning shirts. However, Mr. Reynolds described marked improvement in his tremor since weaning off lithium, with improvement in many functional abilities as a result. He and his wife also describe improved energy levels, cognitive clarity, and emotional disposition off lithium.     Mr. Reynolds stated that his goal for potential DBS surgery is tremor improvement, especially in his right hand. He displayed a good understanding of the surgical process, as well as the potential risks and benefits of surgery. He denied history of panic attacks and claustrophobia. He explained that his greatest fear regarding the surgery is risk of infection.    While his wife noted that he occasionally repeats questions and stories, Mr. Reynolds and his wife denied significant concerns related to his cognitive functioning. Functionally, Mr. Reynolds stated that he continues to drive without difficulty. He also manages personal cares, finances, and medications independently and without difficulty. Reportedly, his wife manages the meal preparation and housekeeping, which does not represent a change.     Mr. Reynolds endorsed good sleep at night with amitriptyline, and he takes an afternoon nap each day for 45-60 minutes. He noted an attempt at CPAP therapy for mild sleep apnea, but he did not find it beneficial. He and his wife reported REM sleep behaviors which regularly occurred approximately 30 years ago (i.e., during the first decade of their marriage)  but denied current REM sleep behaviors. He suspects the dream enactment ended when he achieved abstinence from alcohol and drugs and started  treating underlying bipolar disorder. He described good daytime energy, daily walks, and participation at a gym. He explained that his wife has changed his diet following news of high A1C. Since this dietary change, he has reportedly lost 30 pounds and his A1C is within a healthy range.     Prior to becoming diagnosed with bipolar disorder, Mr. Reynolds described his mood as having  real highs and lows.  He described manic episodes as working on projects without regard for the time and without need for sleep. He also described periods of suicidal ideation. He reported a preparatory suicidal behavior around age 15-16. He explained that he created a wire noose with intention to hang himself. However, he described hope that his life would improve, and he decided not to. He did not sustain bodily harm and did not require medical attention. He reported additional suicidal preparatory behavior at age 37. He retrieved his shotgun with intention to shoot himself, but explained that he was out of shells. Upon going to buy shells, he decided to contact a mental healthcare professional instead. He completed a 72-hour inpatient hospitalization, which he described as helpful. During psychiatric hospitalization, he was diagnosed with bipolar disorder and began the process of finding appropriate psychiatric medication. He took lithium for approximately 30 years and currently takes lamotrigine.    Mr. Reynolds also reported significant alcohol and drug abuse from his early teens until age 37. He reportedly used alcohol, marijuana, tobacco, sedatives, tranquilizers, stimulants, hallucinogens, and opioids. He added that use of alcohol and other drugs exacerbated his psychiatric symptoms and caused auditory hallucinations. He became sober with the support of his brother. He explained that in young adulthood, his brother provided him a place to stay while he independently detoxed off heroin, using alcohol and cannabis to step down.  Complete sobriety from alcohol and all other drugs came later. He has maintained sobriety for 31 years and regularly attends Alcoholics Anonymous meetings. Additionally, he quit smoking cigarettes 10 years ago (formerly 1.5 packs per day).     Mr. Reynolds described his current mood as  great  and added that he has a new  spark  since stopping lithium. His wife reported that he has been  much brighter  and he is  ready to get up and go.  Mood symptoms are reportedly well controlled, and he has not experienced a manic episode in many years. He explained that, since weaning off of lithium, he feels more motivated and less lethargic. He denied impulsive or compulsive behaviors. He and his wife indicated good understanding of what would be warning signs that he is shifting into a manic phase. He reported some stress surrounding a potential diagnosis of Parkinson s disease. He denied current suicidal ideation, plans, or intentions. There was no evidence of hallucinations or delusions. He is currently under the care of a psychiatrist.     Regarding educational history, Mr. Reynolds reported behavioral problems throughout school. He described himself as disruptive and added that he frequently acted out and got into trouble. He was reportedly expelled from high school after punching a . He explained that he often came to school intoxicated and fell asleep during classes. He denied learning difficulties throughout school. He did not require classroom accommodations or special education. He was not held back. He described his grades as acceptable and reported that he graduated high school. He attended college for one semester and did not complete a degree. 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     Mr. Reynolds currently lives with his wife. The couple have been  for 40 years. He adopted his wife s daughter and they have two grandchildren. He also has a brother and sisters-in-law who  provide support. He described his family as his source of resilience and his motivation for maintaining his health.    BEHAVIORAL OBSERVATIONS    Mr. Reynolds arrived on time and accompanied to the appointment by his wife who offered historical information. He was well-groomed and appropriately dressed. He wore glasses to correct his vision. Hearing, speech, and gait were unremarkable. Tremor was present in both hands, particularly while in action. He was pleasant and cooperative throughout the evaluation. His mood appeared euthymic outside of one instance of tearfulness when describing his path toward sobriety. He had a good attitude toward testing and did not show signs of frustration. A task of motor-mediated divided attention was discontinued due to inability to perform (TMT Part B = 5 minutes, 7 errors). Performance validity measures fell within normal limits. The results are likely to accurately reflect his current level of functioning.     MEASURES ADMINISTERED    The following measures were administered by a trained psychometrist, under my supervision:    Dementia Rating Scale, 2nd Edition; Wechsler Adult Intelligence Scale, 4th Edition - Comprehension, Letter-Number Sequencing, Digit Span, Matrix Reasoning; Wide Range Achievement Test, 4th Edition - Reading; Wechsler Memory Scale, 4th Edition - Logical Memory I and II; [employer] Naming Test; Radha-Sheffield Executive Function System - Verbal Fluency; Clock Drawing; Trail Making Test; Stroop Test; [place] Card Sort Test (64); [place] Judgement of Line Orientation; Rich Verbal Learning Test, Revised; Brief Visuospatial Memory Test, Revised; Charbel Complex Figure Test; Barlow Depression Inventory, 2nd Edition; Apathy Scale; Kirkland Sleepiness Scale; REM sleep behavior disorder questionnaire; [place] Multiphasic Personality Inventory, 3rd Edition.     RESULTS AND INTERPRETATION    Orientation: Mr. Reynolds was alert and oriented to person, place, and time.    Overall  Cognitive Abilities: Premorbid intellectual functioning was estimated to fall in the high average range, based on single word reading abilities. Performance on a screening measure of dementia was below average (DRS-2 Total Score = 123/144), with trouble on Initiation/Perseveration and Conceptualization subtests.    Language & Related Skills: Confrontation naming was in the average range for his age and level of education. Verbal reasoning was in the low average range. Letter fluency was low average and generative naming to category was below average for his age and level of education.     Attention & Working Memory: Simple attention and working memory with digits were average. Working memory with letters and numbers was in the low average range.     Visual Perceptual & Constructional Skills: Basic visual perception, including matching lines and angles, was average for his age and level of education. Construction of a clock to command was notable for the impact of tremor and for indistinct clock-hand length. Construction of a clock to command was notable for the impact of tremor and inconsistent number spacing. Construction of a complex design was below average and was notable for loss of gestalt and perseveration of details. Visual problem solving was in the low average range.    Learning & Anterograde Memory: Immediate recall of verbal narrative material was average, with average recall following a 30-minute delay. Recognition of narrative content was in the high average range. On a multiple trial list-learning task, initial learning was in the low average range, with low average recall following a 30-minute delay. Recognition for list items was below average for low hit rate and elevated false-positive rate. Immediate recall of 6 simple figures was below average, with commensurately below average recall following a 30-minute delay. Recognition for the 6 figures was below average.     Mental Speed & Executive  Functioning: Speeded number sequencing was average, with one set-loss error. Divided attention was in the low average to average range when verbally mediated. A task of motor-mediated divided attention was discontinued due to inability to perform (TMT Part B = 5 minutes, 7 errors). Color naming speed was average. Word reading speed was average.  Performance on a measure of inhibition was in the low average range. Novel problem-solving, including the ability to generate strategies and solutions, was exceptionally low for his age and level of education.    Emotional Functioning and Self-Report Measures: On self-report measures of behavioral and psychiatric functioning, Mr. Reynolds endorsed minimal symptoms of depression (BDI-II = 1), minimal symptoms of apathy (SAS = 3), minimal daytime sleepiness (ESS = 4), and absence of significant REM sleep behaviors RBSDQ = 1).     Due to time constraints with other same-day appointments, Mr. Reynolds was not able to complete the MMPI-3 in the clinic. He was emailed a link to complete it online at home. As of the filing of this report, he has yet to start it.     IMPRESSIONS    Results from the present evaluation indicated significant challenges with frontal-subcortical functions, including sequencing, problem solving, divided attention, conceptualization, and planning. Results also revealed difficulty with visual learning, memory, and recognition. Reproduction of visual information was imprecise for tremor on simple items but grossly distorted and lacking gestalt apprehension on complex images. According to Mr. Reynolds and his wife, he continues to drive and independently perform personal-cares and instrumental activities of daily living without difficulty.     With regard to psychiatric functioning, he endorsed minimal psychiatric symptoms on self-report measures. He denied suicidal ideation, plans, or intentions. Mr. Reynolds and his wife reported improvement in mood,  motivation, and energy since his shift from lithium to lamotrigine. They did not indicate concerns that he could be shifting toward a manic phase; it We are awaiting his completion of the MMPI-3 and will file an addendum to this report once it is received.     Mr. Reynolds  current cognitive profile is indicative of mild cognitive impairment (MCI). Executive and complex spatial processing weaknesses can be seen in bipolar disorder and substance abuse disorders, but the level of difficulty seen today likely represents a change from baseline levels. He had a mild left MCA stroke about 3 years ago, but all records indicate no sequelae beyond subtle right upper extremity weakness, and today s data cannot be explained by left MCA territory deficits alone. Overall, his cognitive profile is most consistent with expectations for cognitive changes in Parkinson s disease. Additionally, while it is unclear when cognitive symptoms began, it is most likely that they followed motor symptoms which began 15 years ago. Lastly, records indicate abnormal DATscan, as well as the presence of resting tremor, rigidity, bradykinesia, and a reduced arm swing.     RECOMMENDATIONS    1. Given the current concern for Parkinson s disease and cognitive symptoms, it is reasonable to follow him over time and track progress of cognitive symptoms. Today s results may serve as a baseline of his neurocognitive functioning, and repeated neuropsychological evaluation in 1 year is advised. Results from this evaluation may also be of use for up to one year if he decides to pursue deep brain stimulation surgery.    2. Mr. Reynolds should follow-up with his neurologist about the results of this evaluation.     3. His weaknesses in executive functioning and complex spatial information processing indicate increased risk for driving problems. To ensure continued safety while driving, Mr. Reynolds would benefit from a driving evaluation.    4. Considering his  psychiatric history, he should continue to be followed by psychiatry. With recent discontinuation of lithium and notably brighter mood, close monitoring is needed to ensure he does not shift into a manic episode.     5. American Parkinson s Disease Association has helpful resources such as information, research, virtual or in-person support groups, exercises, therapies, and more.  a. https://www.apdaparkinson.org/community/[place]/local-resources-support/    6. Mr. Reynolds should continue to manage modifiable risk for premature cognitive decline.  a. He should endeavor to maintain as healthy and engaging a lifestyle as possible, with a variety of intrinsically enjoyable physical, mental, and social activities.   b. Considering stroke history, he should continue to manage vascular risk by eating a healthy and balanced diet, remaining active, remaining abstinent from tobacco products, and taking his medication as prescribed.   c. He might consider adopting the MIND diet (a hybrid of Mediterranean approaches and the DASH diet designed for hypertension), which has research support for sustaining cognitive health.     7. Quality sleep is important for cognitive health. Mr. Reynolds current experiences sleep apnea and struggled to maintain CPAP use. He may consider a consultation with sleep medicine. He may benefit from trying different mask styles or other options for treating sleep apnea, such as oral appliances.     Dayanara Baker, PhD  Postdoctoral Neuropsychological Fellow    Lebron Doyle, PhD, LP, ABPP-CN  Board Certified in Clinical Neuropsychology  Licensed Psychologist [id number]      All services provided by the Postdoctoral Fellow were supervised by this licensed psychologist and all billing noted here is for professional services provided by the psychologist and psychometrist.    Time spent: One unit psychiatric evaluation including records review, interview, and clinical assessment licensed and board-certified  neuropsychologist (CPT 63916). 101 minutes neuropsychological testing evaluation by licensed and board-certified neuropsychologist, including integration of patient data, interpretation of standardized test results and clinical data, clinical decision-making, treatment planning, report, and interactive feedback to the patient (CPT 12215, [id number]). 204 minutes of psychological and neuropsychological test administration and scoring by technician (CPT 23582, [id number]). Diagnoses: G31.84, F41.9       Attending Only

## 2022-10-19 ENCOUNTER — APPOINTMENT (OUTPATIENT)
Dept: ORTHOPEDIC SURGERY | Facility: CLINIC | Age: 27
End: 2022-10-19

## 2022-10-19 PROCEDURE — 99214 OFFICE O/P EST MOD 30 MIN: CPT

## 2022-10-19 NOTE — HISTORY OF PRESENT ILLNESS
[FreeTextEntry1] : 10/19/2022: The patient is a 27 year old male presenting with sister for a follow-up evaluation of his right ankle, subtalar joint dislocation. The patient presents ambulating with crutches and is wearing a posterior splint. He is taking Aspirin for DVT Prophylaxis. He has been NWB since his last evaluation. No change since the previous office visit. No other complaints. \par \par 10/14/2022: The patient is a 27 year old male presenting with his sister for an initial evaluation of an acute right ankle injury sustained last night. The patient reports that while exercising, he jumped and rolled his ankle, resulting in a dislocation. He was seen at [facility] last night where he obtained x-rays and was placed in a posterior splint. His ankle was reduced in the hospital. He presents today for further evaluation of the same and for orthopedic evaluation. The patient presents ambulating with crutches and is wearing the posterior splint. Pain is localized to the ankle, with concerns of swelling. He is taking Tylenol PRN for pain. No other complaints. \par \par Of note, the patient is currently on a GI medication as per his sister.

## 2022-10-19 NOTE — DISCUSSION/SUMMARY
[de-identified] : Today I had a lengthy discussion with the patient regarding their right ankle injury. I have addressed all the patient's concerns surrounding the pathology of their condition. At this time I would like to obtain advanced imaging of the patient's right ankle. An MRI was ordered so I can find out more about the etiology of the patient's condition given his history of subtalar joint dislocation. The patient should follow up with the office after obtaining the MRI.\par \par In the interim, I recommended that the patient utilize a CAM boot. The patient was fitted for the CAM boot in the office today. The patient was educated about the boot wear pattern and utilization, as well as the timeframe to come out of the boot. He was also given full instructions for using the boot. He should remain nonweightbearing until further evaluation. I advised the patient to utilize 325 mg of Aspirin as instructed for blood thinning purposes. I recommend that the patient utilize ice PRN. They can also elevate their right ankle above the level of the heart. The patient understood and verbally agreed to the treatment plan. All of their questions were answered and they were satisfied with the visit. The patient should call the office if they have any questions or experience worsening symptoms.

## 2022-10-19 NOTE — REASON FOR VISIT
[Follow-Up Visit] : a follow-up visit for [Family Member] : family member [FreeTextEntry2] : acute right ankle injury

## 2022-10-19 NOTE — PHYSICAL EXAM
[de-identified] : Physical exam right ankle:\par \par Limited physical exam of the ankle due to protective splint.  Small blister anterolaterally. Patient able to wiggle his toes and has normal sensation in his toes. + Swelling noted. Neurovascularly intact.  Capillary refill in the toes is less than 2 seconds.  Normal knee range of motion without pain. [de-identified] : Xrays of right ankle obtained from [facility] on 10/13/2022 and reviewed in the office today, 10/19/2022 , revealed: Subtalar joint dislocation.\par \par 3V of the right ankle were reviewed by me today, 10/19/2022, revealed: Subtalar joint dislocation.\par

## 2022-11-02 ENCOUNTER — APPOINTMENT (OUTPATIENT)
Dept: NEUROLOGY | Facility: CLINIC | Age: 27
End: 2022-11-02

## 2022-11-02 ENCOUNTER — APPOINTMENT (OUTPATIENT)
Dept: MRI IMAGING | Facility: CLINIC | Age: 27
End: 2022-11-02

## 2022-11-02 DIAGNOSIS — R20.2 PARESTHESIA OF SKIN: ICD-10-CM

## 2022-11-02 DIAGNOSIS — G43.909 MIGRAINE, UNSPECIFIED, NOT INTRACTABLE, W/OUT STATUS MIGRAINOSUS: ICD-10-CM

## 2022-11-02 DIAGNOSIS — F41.9 ANXIETY DISORDER, UNSPECIFIED: ICD-10-CM

## 2022-11-02 PROCEDURE — 99202 OFFICE O/P NEW SF 15 MIN: CPT | Mod: 95

## 2022-11-02 RX ORDER — FAMOTIDINE 20 MG/1
20 TABLET, FILM COATED ORAL
Qty: 30 | Refills: 0 | Status: ACTIVE | COMMUNITY
Start: 2022-09-30

## 2022-11-02 NOTE — DATA REVIEWED
[de-identified] : MRI brain and cervical w/w/o 9/21/2022 normal. [de-identified] : Labs from PCP reviewed 8/22/022- TSH, CMP, CMP, [place], TEO negative, HbA1c.

## 2022-11-02 NOTE — REASON FOR VISIT
[Home] : at home, [unfilled] , at the time of the visit. [Medical Office: ([facility])___] : at the medical office located in  [Patient] : the patient [Follow-Up: _____] : a [unfilled] follow-up visit

## 2022-11-02 NOTE — ASSESSMENT
[FreeTextEntry1] : Assessment/Plan:\par  # Intermittent left hemibody paresthesias- unclear etiology of symptoms. Initial presentation of tingling over hands could likely have been explained by a carpal tunnel +/- cubital tunnel syndrome. MRI brain normal. Neurological normal. \par Symptoms are now 80% better, though heighten in the setting of stress/anxiety.\par Suspect possible psychosomatic symptom vs a migraine aura/phenomena(though atypical given duration of symptom)\par Recommend speaking to PCP regarding mood disorder. Recommending seeing a behavioral health therapist\par \par \par # Migraine headaches- better controlled. \par [] Continue sumatriptan 50 mg tablet, take 1 tablet at the onset of the headache. Can repeat dose in 2 hours if needed. Limit dose to 2 tab/day and 5 tab/week. Discussed the side effects drowsiness and nausea/diarrhea.\par [] Continue over the counter preventative therapies discussed, which include Magnesium 400 mg QD (discussed potential side effect of diarrhea), riboflavin 400 mg QD and Coenzyme Q10 100 mg daily.\par Headache education provided:\par [] Stay well hydrated\par [] Limit excessive caffeine and alcohol intake\par [] Maintain good sleep hygiene. Follow a consistent sleep and wake schedule. \par [] Practice good eating habits. Avoid skipping meals. \par [] Try to avoid any known triggers\par [] Avoid excessive use of over the counter pain medications, as they can cause medication overuse headaches \par [] Keep a headache diary\par [] Relaxation techniques, biofeedback, massage therapy, acupunctures, and heating pads may be effective\par \par \par Return to clinic 3 months\par \par The above plan was discussed with LINDA MCLAUGHLIN in great detail.  LINDA MCLAUGHLIN verbalized understanding and agrees with plan as detailed above. Patient was provided education and counselling on current diagnosis/symptoms, diagnostic work up, treatment options and potential side effects of any prescribed therapy/therapies. He was advised to call our clinic at [phone] for any new or worsening symptoms, or with any questions or concerns. In case of acute onset of neurological symptoms or worsening presentation, patient was advised to present to nearest emergency room for further evaluation. LINDA MCLAUGHLIN expressed understanding and all his questions/concerns were addressed.\par \par Marcelle Ybarra M.D\par

## 2022-11-04 ENCOUNTER — APPOINTMENT (OUTPATIENT)
Dept: ORTHOPEDIC SURGERY | Facility: CLINIC | Age: 27
End: 2022-11-04

## 2022-11-09 ENCOUNTER — APPOINTMENT (OUTPATIENT)
Dept: ORTHOPEDIC SURGERY | Facility: CLINIC | Age: 27
End: 2022-11-09

## 2022-11-09 PROCEDURE — 99214 OFFICE O/P EST MOD 30 MIN: CPT

## 2022-11-09 NOTE — REASON FOR VISIT
[Follow-Up Visit] : a follow-up visit for [Family Member] : family member [FreeTextEntry2] : right ankle injury

## 2022-11-09 NOTE — HISTORY OF PRESENT ILLNESS
[FreeTextEntry1] : 11/9/2022: The patient returns to the office to review MRI results of the right ankle dislocation injury. The patient presents ambulating with crutches and is wearing a CAM boot. The patient is accompanied by their sister. Pain is reported to be improved since his last evaluation. On ASA for DVT Prophylaxis. No other complaints. \par \par 10/19/2022: The patient is a 27 year old male presenting with sister for a follow-up evaluation of his right ankle, subtalar joint dislocation. The patient presents ambulating with crutches and is wearing a posterior splint. He is taking Aspirin for DVT Prophylaxis. He has been NWB since his last evaluation. No change since the previous office visit. No other complaints. \par \par 10/14/2022: The patient is a 27 year old male presenting with his sister for an initial evaluation of an acute right ankle injury sustained last night. The patient reports that while exercising, he jumped and rolled his ankle, resulting in a dislocation. He was seen at [facility] last night where he obtained x-rays and was placed in a posterior splint. His ankle was reduced in the hospital. He presents today for further evaluation of the same and for orthopedic evaluation. The patient presents ambulating with crutches and is wearing the posterior splint. Pain is localized to the ankle, with concerns of swelling. He is taking Tylenol PRN for pain. No other complaints. \par \par Of note, the patient is currently on a GI medication as per his sister.

## 2022-11-09 NOTE — ADDENDUM
[FreeTextEntry1] : I, Amber Eduardo, acted solely as a scribe for Dr. Tolu Carson on this date 11/09/2022.\par \par All medical record entries made by the Scribe were at my, Dr. Tolu Carson, direction and personally dictated by me on 11/09/2022. I have reviewed the chart and agree that the record accurately reflects my personal performance of the history, physical exam, assessment and plan. I have also personally directed, reviewed, and agreed with the chart.	\par

## 2022-11-09 NOTE — DISCUSSION/SUMMARY
[de-identified] : Today I had a lengthy discussion with the patient regarding their right ankle injury. I have addressed all the patient's concerns surrounding the pathology of their condition. MRI results were reviewed with the patient in the office today. I recommend the patient undergo a course of physical therapy for the right ankle  2-3 times a week for a total of 8-12 weeks. A prescription was given for the physical therapy today. I recommended that the patient begin to transition out of the CAM boot to an ASO brace. The ASO brace was given today. Continue with the Aspirin as directed for DVT Prophylaxis until the patient transitions out of the CAM boot. A refill was provided for the Aspirin 325 mg in the office today. I recommend that the patient utilize ice, NSAIDs, and heat PRN. They can also elevate their right ankle above the level of the heart. The patient understood and verbally agreed to the treatment plan. All of their questions were answered and they were satisfied with the visit. The patient should call the office if they have any questions or experience worsening symptoms. I would like to see the patient back in the office in 6 weeks to reassess their condition. 				\par

## 2022-11-09 NOTE — PHYSICAL EXAM
[de-identified] : Physical exam right ankle:\par \par Small blister anterolaterally, improved. Patient able to wiggle his toes and has normal sensation in his toes. + Swelling noted. Neurovascularly intact.  Capillary refill in the toes is less than 2 seconds.  Normal knee range of motion without pain. [de-identified] : MRI of the right ankle done on 11/04/2022 at  Radiology results reviewed 11/09/2022 , results as reported in the chart:\par \par IMPRESSION:\par 1. Acute trabecular fractures in the talus and medial navicular bone silhouette.\par 2. Partial tear of the anterior talofibular ligament.\par 3. Tenosynovitis of the peroneal tendons.

## 2022-12-23 ENCOUNTER — APPOINTMENT (OUTPATIENT)
Dept: ORTHOPEDIC SURGERY | Facility: CLINIC | Age: 27
End: 2022-12-23

## 2022-12-23 PROCEDURE — 99213 OFFICE O/P EST LOW 20 MIN: CPT

## 2022-12-23 NOTE — HISTORY OF PRESENT ILLNESS
[FreeTextEntry1] : 12/23/2022: The patient is a 27 year old male presenting for a follow-up evaluation of his right ankle dislocation injury. The patient has been attending physical therapy for this issue twice weekly with improvement and relief. He does report concerns of pain to his ligament zone. He does report improvement with strengthening of his ankle. He is wearing sneakers with an ASO brace and is walking without assistance. No other complaints. \par \par 11/9/2022: The patient returns to the office to review MRI results of the right ankle dislocation injury. The patient presents ambulating with crutches and is wearing a CAM boot. The patient is accompanied by their sister. Pain is reported to be improved since his last evaluation. On ASA for DVT Prophylaxis. No other complaints. \par \par 10/19/2022: The patient is a 27 year old male presenting with sister for a follow-up evaluation of his right ankle, subtalar joint dislocation. The patient presents ambulating with crutches and is wearing a posterior splint. He is taking Aspirin for DVT Prophylaxis. He has been NWB since his last evaluation. No change since the previous office visit. No other complaints. \par \par 10/14/2022: The patient is a 27 year old male presenting with his sister for an initial evaluation of an acute right ankle injury sustained last night. The patient reports that while exercising, he jumped and rolled his ankle, resulting in a dislocation. He was seen at [facility] last night where he obtained x-rays and was placed in a posterior splint. His ankle was reduced in the hospital. He presents today for further evaluation of the same and for orthopedic evaluation. The patient presents ambulating with crutches and is wearing the posterior splint. Pain is localized to the ankle, with concerns of swelling. He is taking Tylenol PRN for pain. No other complaints. \par \par Of note, the patient is currently on a GI medication as per his sister.

## 2022-12-23 NOTE — DISCUSSION/SUMMARY
[de-identified] : Today I had a lengthy discussion with the patient regarding their right ankle injury. I have addressed all the patient's concerns surrounding the pathology of their condition. I recommend the patient undergo a course of physical therapy for the right ankle  2-3 times a week for a total of 8-12 weeks. A prescription was given for the physical therapy today. He should wean out of the ASO brace to an OTC ankle sleeve PRN over the next 1 month. I recommend that the patient perform a home exercise program for the lower extremities. The patient understood and verbally agreed to the treatment plan. All of their questions were answered and they were satisfied with the visit. The patient should call the office if they have any questions or experience worsening symptoms. I would like to see the patient back in the office in 3-4 months PRN to reassess their condition. 				\par

## 2022-12-23 NOTE — ADDENDUM
[FreeTextEntry1] : I, Amber Eduardo, acted solely as a scribe for Dr. Tolu Carson on this date 12/23/2022.\par \par All medical record entries made by the Scribe were at my, Dr. Tolu Carson, direction and personally dictated by me on 12/23/2022. I have reviewed the chart and agree that the record accurately reflects my personal performance of the history, physical exam, assessment and plan. I have also personally directed, reviewed, and agreed with the chart.	\par

## 2022-12-23 NOTE — PHYSICAL EXAM
[de-identified] : Physical exam right ankle:\par \par Small blister anterolaterally, improved. Patient able to wiggle his toes and has normal sensation in his toes. + Swelling noted, improved. Neurovascularly intact.  Capillary refill in the toes is less than 2 seconds.  Normal knee range of motion without pain. [de-identified] : No new imaging.

## 2023-01-18 ENCOUNTER — APPOINTMENT (OUTPATIENT)
Dept: CARDIOLOGY | Facility: CLINIC | Age: 28
End: 2023-01-18

## 2023-02-19 ENCOUNTER — NON-APPOINTMENT (OUTPATIENT)
Age: 28
End: 2023-02-19

## 2023-02-26 ENCOUNTER — NON-APPOINTMENT (OUTPATIENT)
Age: 28
End: 2023-02-26

## 2023-03-14 ENCOUNTER — NON-APPOINTMENT (OUTPATIENT)
Age: 28
End: 2023-03-14

## 2023-03-14 ENCOUNTER — APPOINTMENT (OUTPATIENT)
Dept: NEUROLOGY | Facility: CLINIC | Age: 28
End: 2023-03-14
Payer: MEDICAID

## 2023-03-14 VITALS
WEIGHT: 242 LBS | TEMPERATURE: 97.8 F | BODY MASS INDEX: 33.88 KG/M2 | SYSTOLIC BLOOD PRESSURE: 122 MMHG | HEIGHT: 71 IN | HEART RATE: 99 BPM | DIASTOLIC BLOOD PRESSURE: 84 MMHG

## 2023-03-14 DIAGNOSIS — R20.2 PARESTHESIA OF SKIN: ICD-10-CM

## 2023-03-14 DIAGNOSIS — S16.1XXA STRAIN OF MUSCLE, FASCIA AND TENDON AT NECK LEVEL, INITIAL ENCOUNTER: ICD-10-CM

## 2023-03-14 PROCEDURE — 99214 OFFICE O/P EST MOD 30 MIN: CPT

## 2023-03-14 RX ORDER — ASPIRIN 325 MG/1
325 TABLET ORAL DAILY
Qty: 30 | Refills: 1 | Status: DISCONTINUED | COMMUNITY
Start: 2022-10-14 | End: 2023-03-14

## 2023-03-14 NOTE — PHYSICAL EXAM
[FreeTextEntry1] : Examination:\par Constitutional: normal, no apparent distress\par Eyes: normal conjunctiva b/l, no ptosis, visual fields full\par Respiratory: no respiratory distress, normal effort, normal auscultation\par Cardiovascular: normal rate, rhythm, no murmurs\par Neck: supple, no masses\par Vascular: carotids normal\par Skin: normal color, no rashes\par Psych: normal mood, affect\par \par Neurological:\par Memory: normal memory, oriented to person, place, time\par Language intact/no aphasia\par Cranial Nerves: II-XII intact, Pupils equally round and reactive to light, ocular muscles/movements intact, no ptosis, no facial weakness, tongue protrudes normally in the midline, \par Motor: normal tone, no pronator drift, full strength in all four extremities in the proximal and distal muscle groups\par Coordination: Fine motor movements intact, rapid alternating movements intact, finger to nose intact bilaterally\par Sensory: intact to light touch, vibration, joint position sense\par DTRs: symmetric, 2+ in b/l triceps, 2+ in b/l biceps, 2+ in b/l brachioradialis, 2+ in bilateral patellars, 2+ in bilateral Achilles, Babinskis negative bilaterally, Hinds's negative b/l\par Gait: narrow based, steady, able to walk on heels, toes, tandem gait\par \par

## 2023-03-14 NOTE — DATA REVIEWED
[de-identified] : MRI brain with and without contrast 9/21/22: normal examination of the brain\par MRI cervical spine with and without contrast 9/21/22:\par Straightening of the cervical lordosis. Otherwise unremarkable examination of\par the cervical spine. [de-identified] : blood tests August 2022: \par TEO negative, Lyme negative, HbA1C 5.2, TSH 0.718

## 2023-03-14 NOTE — CONSULT LETTER
[Dear  ___] : Dear  [unfilled], [Consult Letter:] : I had the pleasure of evaluating your patient, [unfilled]. [Please see my note below.] : Please see my note below. [Consult Closing:] : Thank you very much for allowing me to participate in the care of this patient.  If you have any questions, please do not hesitate to contact me. [FreeTextEntry2] : Cleopatra Apodaca [FreeTextEntry3] : Sincerely,\par \par \par Darlin Nieves MD\par Diplomate, American Academy of Psychiatry and Neurology\par Board Certified in the Subspecialty of Clinical Neurophysiology\par Board Certified in the Subspecialty of Sleep Medicine\par Board Certified in the Subspecialty of Epilepsy\par

## 2023-03-14 NOTE — HISTORY OF PRESENT ILLNESS
[FreeTextEntry1] : Mr. Burks is here today for neurology evaluation.\par In June 2022 he noted tingling in the left side of his face and his left hand. Eventually this spread to involve his left lower leg and the posterior aspect of his left shoulder. \par MRI brain was ordered by Dr. Ybarra which was normal. MRI cervical spine showed straightening of the normal cervical lordosis but was otherwise normal. \par He states that she recommended that he take magnesium 400 mg and vitamin B2 100 mg/day as well as coenzyme q10 in case this was migraine related.\par He states that symptoms are constant.\par He notes that symptoms can be exacerbated after eating honey. If he has honey in the AM he immediately notices tingling of his face, foot and sometimes his left hand. \par Emotions - positive or negative, can bring out the tingling.\par Sometimes symptoms are also present on the right face and shoulder. 80-90% of the time symptoms are present on the left. \par \par He notes some improvement in symptoms with improvement in sleep and stress.\par He denies loss of feeling.\par Occasionally he feels weak.\par \par He takes pantoprazole and famotidine for reflux. He thinks these medications may be playing a role.\par He has now only been taking famotidine intermittently.\par \par He used to have headaches when sleep schedule was poor. \par \par He is not a vegetarian.

## 2023-03-14 NOTE — DISCUSSION/SUMMARY
[FreeTextEntry1] : Mr. Epps is a 27 year old man with several months of paresthesias, mainly left sided.\par He has had imaging of his brain which was unremarkable.\par MRI cervical spine shows some straightening of the cervical lordosis but no spinal cord pathology.\par \par He was given reassurance that significant pathologies such as stroke, tumors, MS have been excluded with imaging.\par \par -I will check vitamin B12 level to look for any deficiency\par -Suggest a trial of physical therapy for cervical strain/muscle spasm\par -He is scheduled to see a new gastroenterologist. He will discuss medication changes. \par He is also working on weight loss. He has lost 40 pounds over the last year. \par \par -He can continue to take the supplements suggested by Dr. Ybarra for possible migraine phenomenon. Headaches are well controlled at this time.\par \par f/u 6 months, sooner if needed.

## 2023-03-14 NOTE — DATA REVIEWED
[de-identified] : MRI brain with and without contrast 9/21/22: normal examination of the brain\par MRI cervical spine with and without contrast 9/21/22:\par Straightening of the cervical lordosis. Otherwise unremarkable examination of\par the cervical spine. [de-identified] : blood tests August 2022: \par TEO negative, Lyme negative, HbA1C 5.2, TSH 0.718

## 2023-04-13 ENCOUNTER — APPOINTMENT (OUTPATIENT)
Dept: ORTHOPEDIC SURGERY | Facility: CLINIC | Age: 28
End: 2023-04-13
Payer: MEDICAID

## 2023-04-13 PROCEDURE — 99214 OFFICE O/P EST MOD 30 MIN: CPT

## 2023-04-13 PROCEDURE — 73610 X-RAY EXAM OF ANKLE: CPT | Mod: RT

## 2023-04-13 NOTE — HISTORY OF PRESENT ILLNESS
[FreeTextEntry1] : 04/13/2023: The patient is a 27 year old male presenting with right ankle pain. Starting 2 days ago, the patient noticed pain and swelling in his right ankle. He could not work for two days and could not walk. He used a boot to ambulate. He started working out at the gym 4-5 days ago, and 2 weeks ago he began to play soccer and after playing/working out, he notices some pain that alleviates after a few days. He presents to the office in sneakers ambulating without assistance and with a slight limp. \par \par 12/23/2022: The patient is a 27 year old male presenting for a follow-up evaluation of his right ankle dislocation injury. The patient has been attending physical therapy for this issue twice weekly with improvement and relief. He does report concerns of pain to his ligament zone. He does report improvement with strengthening of his ankle. He is wearing sneakers with an ASO brace and is walking without assistance. No other complaints. \par \par 11/9/2022: The patient returns to the office to review MRI results of the right ankle dislocation injury. The patient presents ambulating with crutches and is wearing a CAM boot. The patient is accompanied by their sister. Pain is reported to be improved since his last evaluation. On ASA for DVT Prophylaxis. No other complaints. \par \par 10/19/2022: The patient is a 27 year old male presenting with sister for a follow-up evaluation of his right ankle, subtalar joint dislocation. The patient presents ambulating with crutches and is wearing a posterior splint. He is taking Aspirin for DVT Prophylaxis. He has been NWB since his last evaluation. No change since the previous office visit. No other complaints. \par \par 10/14/2022: The patient is a 27 year old male presenting with his sister for an initial evaluation of an acute right ankle injury sustained last night. The patient reports that while exercising, he jumped and rolled his ankle, resulting in a dislocation. He was seen at [facility] last night where he obtained x-rays and was placed in a posterior splint. His ankle was reduced in the hospital. He presents today for further evaluation of the same and for orthopedic evaluation. The patient presents ambulating with crutches and is wearing the posterior splint. Pain is localized to the ankle, with concerns of swelling. He is taking Tylenol PRN for pain. No other complaints. \par \par Of note, the patient is currently on a GI medication as per his sister.

## 2023-04-13 NOTE — DISCUSSION/SUMMARY
[de-identified] : Today I had a lengthy discussion with the patient regarding their right ankle pain.I have addressed all the patient's concerns surrounding the pathology of their condition.\par I recommend that the patient utilize Voltaren gel topically.  If the Voltaren gel could not be obtained, Icy Hot, Biofreeze, or Bengay can be utilized instead. I recommend that the patient utilize ice, NSAIDS PRN, and heat. They can also elevate their right ankle above the level of the heart. \par \par The patient understood and verbally agreed to the treatment plan. All of their questions were answered and they were satisfied with the visit. The patient should call the office if they have any questions or experience worsening symptoms.

## 2023-04-13 NOTE — PHYSICAL EXAM
[de-identified] : General: Alert and oriented x3. In no acute distress. Pleasant in nature with a normal affect. No apparent respiratory distress.\par Right Ankle Exam\par Skin: Clean, dry, intact\par Inspection: No obvious malalignment, no swelling, no effusion; no lymphadenopathy\par Pulses: 2+ DP/PT pulses\par ROM: Right Ankle 10 degrees of dorsiflexion, 40 degrees of plantarflexion, 10 degrees of subtalar motion\par Tenderness: Positive medial malleolus pain,tenderness to the sustentacular and post tib. No tenderness over the lateral malleolus, no CFL/ATFL/PTFL pain. no deltoid ligament pain. No proximal fibular pain. No heel pain.  \par Stability: Negative anterior/posterior drawer.\par Strength: 5/5 TA/GS/EHL\par Neuro: In tact to light touch throughout\par Additional tests: Negative Mortons test, Negative syndesmosis squeeze test.  [de-identified] : 3V of the right ankle were ordered obtained and reviewed by me today, 04/13/2023 , revealed: no abnormalities. no fracture seen

## 2023-04-13 NOTE — ADDENDUM
[FreeTextEntry1] : I, AKUA ALMONTE, acted solely as a scribe for Dr. Tolu Carson on this date 04/13/2023  .\par  \par All medical record entries made by the Scribe were at my, Dr. Tolu Carson, direction and personally dictated by me on 04/13/2023 . I have reviewed the chart and agree that the record accurately reflects my personal performance of the history, physical exam, assessment and plan. I have also personally directed, reviewed, and agreed with the chart.

## 2023-04-23 ENCOUNTER — NON-APPOINTMENT (OUTPATIENT)
Age: 28
End: 2023-04-23

## 2023-04-28 ENCOUNTER — APPOINTMENT (OUTPATIENT)
Dept: ORTHOPEDIC SURGERY | Facility: CLINIC | Age: 28
End: 2023-04-28
Payer: MEDICAID

## 2023-04-28 PROCEDURE — 99213 OFFICE O/P EST LOW 20 MIN: CPT

## 2023-04-28 NOTE — DISCUSSION/SUMMARY
[de-identified] : Today I had a lengthy discussion with the patient regarding their right ankle pain.I have addressed all the patient's concerns surrounding the pathology of their condition. At this time I would like to obtain advanced imaging of the patient's right ankle. An MRI was ordered so I can find out more about the etiology of the patient's condition. The patient should follow up with the office after obtaining the MRI. I recommend that the patient utilize ice, NSAIDS PRN, and heat. They can also elevate their right ankle above the level of the heart.\par \par The patient understood and verbally agreed to the treatment plan. All of their questions were answered and they were satisfied with the visit. The patient should call the office if they have any questions or experience worsening symptoms.

## 2023-04-28 NOTE — ADDENDUM
[FreeTextEntry1] : I, SANJIV MASON, acted solely as a scribe for Dr. Tolu Carson on this date 04/28/2023  .\par  \par All medical record entries made by the Scribe were at my, Dr. Tolu Carson, direction and personally dictated by me on 04/28/2023 . I have reviewed the chart and agree that the record accurately reflects my personal performance of the history, physical exam, assessment and plan. I have also personally directed, reviewed, and agreed with the chart.

## 2023-04-28 NOTE — HISTORY OF PRESENT ILLNESS
[FreeTextEntry1] : 4/28/23: LINDA MCLAUGHLIN is a 27 year old male who presents for follow up evaluation of right ankle pain. He reports his pain has improved minimally with conservative management. He presents today wearing sneakers and is ambulating without assistance. \par \par 04/13/2023: The patient is a 27 year old male presenting with right ankle pain. Starting 2 days ago, the patient noticed pain and swelling in his right ankle. He could not work for two days and could not walk. He used a boot to ambulate. He started working out at the gym 4-5 days ago, and 2 weeks ago he began to play soccer and after playing/working out, he notices some pain that alleviates after a few days. He presents to the office in sneakers ambulating without assistance and with a slight limp. \par \par 12/23/2022: The patient is a 27 year old male presenting for a follow-up evaluation of his right ankle dislocation injury. The patient has been attending physical therapy for this issue twice weekly with improvement and relief. He does report concerns of pain to his ligament zone. He does report improvement with strengthening of his ankle. He is wearing sneakers with an ASO brace and is walking without assistance. No other complaints. \par \par 11/9/2022: The patient returns to the office to review MRI results of the right ankle dislocation injury. The patient presents ambulating with crutches and is wearing a CAM boot. The patient is accompanied by their sister. Pain is reported to be improved since his last evaluation. On ASA for DVT Prophylaxis. No other complaints. \par \par 10/19/2022: The patient is a 27 year old male presenting with sister for a follow-up evaluation of his right ankle, subtalar joint dislocation. The patient presents ambulating with crutches and is wearing a posterior splint. He is taking Aspirin for DVT Prophylaxis. He has been NWB since his last evaluation. No change since the previous office visit. No other complaints. \par \par 10/14/2022: The patient is a 27 year old male presenting with his sister for an initial evaluation of an acute right ankle injury sustained last night. The patient reports that while exercising, he jumped and rolled his ankle, resulting in a dislocation. He was seen at [facility] last night where he obtained x-rays and was placed in a posterior splint. His ankle was reduced in the hospital. He presents today for further evaluation of the same and for orthopedic evaluation. The patient presents ambulating with crutches and is wearing the posterior splint. Pain is localized to the ankle, with concerns of swelling. He is taking Tylenol PRN for pain. No other complaints. \par \par Of note, the patient is currently on a GI medication as per his sister.

## 2023-04-28 NOTE — PHYSICAL EXAM
[de-identified] : General: Alert and oriented x3. In no acute distress. Pleasant in nature with a normal affect. No apparent respiratory distress.\par Right Ankle Exam\par Skin: Clean, dry, intact\par Inspection: No obvious malalignment, no swelling, no effusion; no lymphadenopathy\par Pulses: 2+ DP/PT pulses\par ROM: Right Ankle 10 degrees of dorsiflexion, 40 degrees of plantarflexion, 10 degrees of subtalar motion\par Tenderness: Positive medial malleolus pain,tenderness to the sustentacular and post tib. No tenderness over the lateral malleolus, no CFL/ATFL/PTFL pain. no deltoid ligament pain. No proximal fibular pain. No heel pain.  \par Stability: Negative anterior/posterior drawer.\par Strength: 5/5 TA/GS/EHL\par Neuro: In tact to light touch throughout\par Additional tests: Negative Mortons test, Negative syndesmosis squeeze test.

## 2023-05-11 ENCOUNTER — APPOINTMENT (OUTPATIENT)
Dept: ORTHOPEDIC SURGERY | Facility: CLINIC | Age: 28
End: 2023-05-11
Payer: MEDICAID

## 2023-05-11 DIAGNOSIS — M76.821 POSTERIOR TIBIAL TENDINITIS, RIGHT LEG: ICD-10-CM

## 2023-05-11 DIAGNOSIS — S93.314A DISLOCATION OF TARSAL JOINT OF RIGHT FOOT, INITIAL ENCOUNTER: ICD-10-CM

## 2023-05-11 PROCEDURE — ZZZZZ: CPT

## 2023-05-29 ENCOUNTER — NON-APPOINTMENT (OUTPATIENT)
Age: 28
End: 2023-05-29

## 2023-11-30 ENCOUNTER — APPOINTMENT (OUTPATIENT)
Dept: NEUROLOGY | Facility: CLINIC | Age: 28
End: 2023-11-30

## 2024-01-19 ENCOUNTER — APPOINTMENT (OUTPATIENT)
Dept: PULMONOLOGY | Facility: CLINIC | Age: 29
End: 2024-01-19

## 2024-04-11 NOTE — ED PROVIDER NOTE - CONDITION AT DISCHARGE:
Dr. Joaquin would like the patient admitted for Tikosyn and hold Sotalol 5 days prior to.      Order placed.   Improved

## 2024-07-07 NOTE — PHYSICAL EXAM
Statement Selected [FreeTextEntry1] : Examination:\par Constitutional: normal, no apparent distress\par Eyes: normal conjunctiva b/l, no ptosis, visual fields full\par Respiratory: no respiratory distress, normal effort, normal auscultation\par Cardiovascular: normal rate, rhythm, no murmurs\par Neck: supple, no masses\par Vascular: carotids normal\par Skin: normal color, no rashes\par Psych: normal mood, affect\par \par Neurological:\par Memory: normal memory, oriented to person, place, time\par Language intact/no aphasia\par Cranial Nerves: II-XII intact, Pupils equally round and reactive to light, ocular muscles/movements intact, no ptosis, no facial weakness, tongue protrudes normally in the midline, \par Motor: normal tone, no pronator drift, full strength in all four extremities in the proximal and distal muscle groups\par Coordination: Fine motor movements intact, rapid alternating movements intact, finger to nose intact bilaterally\par Sensory: intact to light touch, vibration, joint position sense\par DTRs: symmetric, 2+ in b/l triceps, 2+ in b/l biceps, 2+ in b/l brachioradialis, 2+ in bilateral patellars, 2+ in bilateral Achilles, Babinskis negative bilaterally, Hinds's negative b/l\par Gait: narrow based, steady, able to walk on heels, toes, tandem gait\par \par

## 2024-07-22 ENCOUNTER — APPOINTMENT (OUTPATIENT)
Dept: PULMONOLOGY | Facility: CLINIC | Age: 29
End: 2024-07-22

## 2024-11-06 NOTE — ED ADULT TRIAGE NOTE - ESI TRIAGE ACUITY LEVEL, MLM
Called the patient and made her aware new order has been placed for the bone scan and she can call to get scheduled   
Order placed again and sent to Dr. Kramer for cosigning   
Patient called the office to report she was told her bone density order was not put when she tried calling to schedule. Please follow up.  
3

## 2024-11-18 ENCOUNTER — NON-APPOINTMENT (OUTPATIENT)
Age: 29
End: 2024-11-18

## 2024-11-24 ENCOUNTER — NON-APPOINTMENT (OUTPATIENT)
Age: 29
End: 2024-11-24

## 2025-02-20 ENCOUNTER — NON-APPOINTMENT (OUTPATIENT)
Age: 30
End: 2025-02-20

## 2025-03-15 ENCOUNTER — NON-APPOINTMENT (OUTPATIENT)
Age: 30
End: 2025-03-15